# Patient Record
Sex: FEMALE | Race: WHITE | Employment: FULL TIME | URBAN - METROPOLITAN AREA
[De-identification: names, ages, dates, MRNs, and addresses within clinical notes are randomized per-mention and may not be internally consistent; named-entity substitution may affect disease eponyms.]

---

## 2017-01-16 ENCOUNTER — OFFICE VISIT (OUTPATIENT)
Dept: FAMILY MEDICINE CLINIC | Age: 57
End: 2017-01-16

## 2017-01-16 VITALS
HEART RATE: 83 BPM | SYSTOLIC BLOOD PRESSURE: 134 MMHG | HEIGHT: 65 IN | OXYGEN SATURATION: 98 % | RESPIRATION RATE: 18 BRPM | DIASTOLIC BLOOD PRESSURE: 70 MMHG | TEMPERATURE: 98.4 F | WEIGHT: 140 LBS | BODY MASS INDEX: 23.32 KG/M2

## 2017-01-16 DIAGNOSIS — R50.9 FEVER, UNSPECIFIED FEVER CAUSE: ICD-10-CM

## 2017-01-16 DIAGNOSIS — R52 BODY ACHES: ICD-10-CM

## 2017-01-16 DIAGNOSIS — J01.00 ACUTE MAXILLARY SINUSITIS, RECURRENCE NOT SPECIFIED: Primary | ICD-10-CM

## 2017-01-16 DIAGNOSIS — J02.9 SORE THROAT: ICD-10-CM

## 2017-01-16 LAB
FLUAV+FLUBV AG NOSE QL IA.RAPID: NEGATIVE POS/NEG
FLUAV+FLUBV AG NOSE QL IA.RAPID: NEGATIVE POS/NEG
S PYO AG THROAT QL: NEGATIVE
VALID INTERNAL CONTROL?: YES
VALID INTERNAL CONTROL?: YES

## 2017-01-16 RX ORDER — AMOXICILLIN 875 MG/1
875 TABLET, FILM COATED ORAL 2 TIMES DAILY
Qty: 20 TAB | Refills: 0 | Status: SHIPPED | OUTPATIENT
Start: 2017-01-16 | End: 2017-01-26

## 2017-01-16 NOTE — PROGRESS NOTES
Chief Complaint   Patient presents with    Generalized Body Aches    Nasal Congestion    Sore Throat     Patient present during walk in clinic with sx began that Saturday; have been treating with Tylenol Sinus and Tylenol. 1. Have you been to the ER, urgent care clinic since your last visit? Hospitalized since your last visit? No    2. Have you seen or consulted any other health care providers outside of the [address] [place] [address] since your last visit? Include any pap smears or colon screening. No    Denies any sick contacts. Had a fever initially. Having sinus pressure and congestion. Teeth feel sore. Last abx was in September for sinus pressure and congestion. Headaches from the congestion. Used afrin this morning which has helped. Has been coughing a lot. Productive. Green congestion. Hears herself wheezing at night. Denies any other concerns at this time. Chief Complaint   Patient presents with    Generalized Body Aches    Nasal Congestion    Sore Throat     she is a 64y.o. year old female who presents for evalution. Reviewed PmHx, RxHx, FmHx, SocHx, AllgHx and updated and dated in the chart.     Review of Systems - negative except as listed above in the HPI    Objective:     Vitals:    01/16/17 0913   BP: 134/70   Pulse: 83   Resp: 18   Temp: 98.4 °F (36.9 °C)   TempSrc: Oral   SpO2: 98%   Weight: 140 lb (63.5 kg)   Height: 5' 5\" (1.651 m)     Physical Examination: General appearance - alert, well appearing, and in no distress  Eyes - pupils equal and reactive, extraocular eye movements intact  Ears - bilateral TM's and external ear canals normal, moderate amount of fluid present behind bilateral TMs  Nose - mucosal congestion, mucosal erythema, purulent rhinorrhea and sinus tenderness noted bilateral maxillary regions with percussion  Mouth - mucous membranes moist, pharynx normal without lesions  Neck - supple, no significant adenopathy  Chest - clear to auscultation, no wheezes, rales or rhonchi, symmetric air entry, productive sounding cough  Heart - normal rate, regular rhythm, normal S1, S2, no murmurs    Assessment/ Plan:   Jeane Palencia was seen today for generalized body aches, nasal congestion and sore throat. Diagnoses and all orders for this visit:    Acute maxillary sinusitis, recurrence not specified  -     amoxicillin (AMOXIL) 875 mg tablet; Take 1 Tab by mouth two (2) times a day for 10 days. Start and complete full course of amoxil. Dwp ADRs/SEs of medication. Push fluids. Rest. Saline nasal spray for nasal congestion. OTC motrin/apap for fevers. RTC if sx persist or worsen. Sore throat / Fever, unspecified fever cause / Body aches  -     AMB POC RAPID STREP A  -     AMB POC CINDY INFLUENZA A/B TEST  Neg flu and strep. Follow-up Disposition:  Return if symptoms worsen or fail to improve. I have discussed the diagnosis with the patient and the intended plan as seen in the above orders. The patient has received an after-visit summary and questions were answered concerning future plans. Medication Side Effects and Warnings were discussed with patient: yes  Patient Labs were reviewed and or requested: yes  Patient Past Records were reviewed and or requested  yes  Patient / Caregiver Understanding of treatment plan was verbalized during office visit YES    DEWAYNE Hatfield-C    Patient Instructions   I have prescribed you the antibiotic Amoxicillin. Take this medication as directed and complete the entire course, even if you're feeling better. If you miss a dose, take it as soon as possible. If you are on birth control pills make sure you use additional back up protection during your course of amoxicillin until your next menstrual period. Common side effects of this medication include diarrhea and rash. If you develop a rash while taking this medication please stop taking this medication and call your health care provider immediately.  Notify your provider if symptoms do not improve one week after completing the course of this antibiotic. For your sore throat:  Zinc may reduce the severity of cold symptoms and could even shorten your illness. The newest zinc lozenges come formulated with herbs like peppermint and licorice to help soothe your throat and relieve chest congestion. Try Cold-Eeze Cold Remedy Plus Multi Symptom Relief Quick Melts ($12 for 24 lozenges). For your runny nose:  Try moistened with natural saline wipes. They are much more gentle than dry tissues. Try Puffs Fresh Faces nose wipes with Vicks ($5 for 45 wipes)    For your congestion:  Since the common cold is caused by a virus, the best medication is rest. But antihistamine-decongestant combo medications are a good bet for easing your symptoms such as post nasal drip and congestion. Try Claritin D 12 hour ($14 for 10 tablets).

## 2017-01-16 NOTE — LETTER
NOTIFICATION RETURN TO WORK 
 
1/16/2017 10:09 AM 
 
Ms. Janessa Pa [facility] [address] [facility] [address] To Whom It May Concern: 
 
Janessa Pa is currently under the care of [address]. Please excuse Ms. Sandhu from missing work Monday January 16th and Tuesday January 17th, 2017 due to illness. If there are questions or concerns please have the patient contact our office.  
 
 
 
Sincerely, 
 
 
Kelby Christina NP

## 2017-01-16 NOTE — MR AVS SNAPSHOT
Visit Information Date & Time Provider Department Dept. Phone Encounter #  
 1/16/2017  7:00 AM Rusty Uribe NP [address] [facility] [phone] [id number] Follow-up Instructions Return if symptoms worsen or fail to improve. Upcoming Health Maintenance Date Due Hepatitis C Screening 1960 Pneumococcal 19-64 Medium Risk (1 of 1 - PPSV23) 10/27/1979 BREAST CANCER SCRN MAMMOGRAM 10/27/2010 PAP AKA CERVICAL CYTOLOGY 7/18/2014 COLONOSCOPY 3/1/2015 INFLUENZA AGE 9 TO ADULT 8/1/2016 DTaP/Tdap/Td series (2 - Td) 7/18/2021 Allergies as of 1/16/2017  Review Complete On: 1/16/2017 By: Rusty Uribe NP Severity Noted Reaction Type Reactions Nsaids (Non-steroidal Anti-inflammatory Drug)  09/21/2012    Other (comments) Stomach upset Current Immunizations  Reviewed on 1/11/2016 Name Date TDAP Vaccine 7/18/2011 Not reviewed this visit You Were Diagnosed With   
  
 Codes Comments Acute maxillary sinusitis, recurrence not specified    -  Primary ICD-10-CM: J01.00 ICD-9-CM: 461.0 Sore throat     ICD-10-CM: J02.9 ICD-9-CM: 052 Fever, unspecified fever cause     ICD-10-CM: R50.9 ICD-9-CM: 780.60 Body aches     ICD-10-CM: R52 ICD-9-CM: 780.96 Vitals BP Pulse Temp Resp Height(growth percentile) Weight(growth percentile) 134/70 (BP 1 Location: Right arm, BP Patient Position: Sitting) 83 98.4 °F (36.9 °C) (Oral) 18 5' 5\" (1.651 m) 140 lb (63.5 kg) SpO2 BMI OB Status Smoking Status 98% 23.3 kg/m2 Postmenopausal Current Every Day Smoker BMI and BSA Data Body Mass Index Body Surface Area  
 23.3 kg/m 2 1.71 m 2 Preferred Pharmacy Pharmacy Name Phone [facility] [address] [phone] Your Updated Medication List  
  
   
 This list is accurate as of: 1/16/17 10:08 AM.  Always use your most recent med list.  
  
  
  
  
 amoxicillin 875 mg tablet Commonly known as:  AMOXIL Take 1 Tab by mouth two (2) times a day for 10 days. Prescriptions Sent to Pharmacy Refills  
 amoxicillin (AMOXIL) 875 mg tablet 0 Sig: Take 1 Tab by mouth two (2) times a day for 10 days. Class: Normal  
 Pharm[facility] [place] [address] [address] #: [phone] Route: Oral  
  
We Performed the Following AMB POC RAPID STREP A [30052 CPT(R)] AMB POC CINDY INFLUENZA A/B TEST [29321 CPT(R)] Follow-up Instructions Return if symptoms worsen or fail to improve. Patient Instructions I have prescribed you the antibiotic Amoxicillin. Take this medication as directed and complete the entire course, even if you're feeling better. If you miss a dose, take it as soon as possible. If you are on birth control pills make sure you use additional back up protection during your course of amoxicillin until your next menstrual period. Common side effects of this medication include diarrhea and rash. If you develop a rash while taking this medication please stop taking this medication and call your health care provider immediately. Notify your provider if symptoms do not improve one week after completing the course of this antibiotic. For your sore throat: 
Zinc may reduce the severity of cold symptoms and could even shorten your illness. The newest zinc lozenges come formulated with herbs like peppermint and licorice to help soothe your throat and relieve chest congestion. Try Cold-Eeze Cold Remedy Plus Multi Symptom Relief Quick Melts ($12 for 24 lozenges). For your runny nose: 
Try moistened with natural saline wipes. They are much more gentle than dry tissues. Try Puffs Fresh Faces nose wipes with Vicks ($5 for 45 wipes) For your congestion: 
Since the common cold is caused by a virus, the best medication is rest. But antihistamine-decongestant combo medications are a good bet for easing your symptoms such as post nasal drip and congestion. Try Claritin D 12 hour ($14 for 10 tablets). Introducing [address]! Yara Kumar introduces [employer] patient portal. Now you can access parts of your medical record, email your doctor's office, and request medication refills online. 1. In your internet browser, go to [email or web address] 2. Click on the First Time User? Click Here link in the Sign In box. You will see the New Member Sign Up page. 3. Enter your [employer] Access Code exactly as it appears below. You will not need to use this code after youve completed the sign-up process. If you do not sign up before the expiration date, you must request a new code. · [employer] Access Code: [email or web address] Expires: 4/16/2017 10:08 AM 
 
4. Enter the last four digits of your Social Security Number (xxxx) and Date of Birth (mm/dd/yyyy) as indicated and click Submit. You will be taken to the next sign-up page. 5. Create a [employer] ID. This will be your [employer] login ID and cannot be changed, so think of one that is secure and easy to remember. 6. Create a [employer] password. You can change your password at any time. 7. Enter your Password Reset Question and Answer. This can be used at a later time if you forget your password. 8. Enter your e-mail address. You will receive e-mail notification when new information is available in [address] 9. Click Sign Up. You can now view and download portions of your medical record. 10. Click the Download Summary menu link to download a portable copy of your medical information. If you have questions, please visit the Frequently Asked Questions section of the [employer] website. Remember, [employer] is NOT to be used for urgent needs. For medical emergencies, dial 911. Now available from your iPhone and Android! Please provide this summary of care documentation to your next provider. Your primary care clinician is listed as TRACEY GARRISON. If you have any questions after today's visit, please call [phone].

## 2017-01-16 NOTE — PROGRESS NOTES
Chief Complaint   Patient presents with    Generalized Body Aches    Nasal Congestion    Sore Throat     Patient present during walk in clinic with sx began that Saturday; have been treating with Tylenol Sinus and Tylenol. 1. Have you been to the ER, urgent care clinic since your last visit? Hospitalized since your last visit? No    2. Have you seen or consulted any other health care providers outside of the [address] [place] [address] since your last visit? Include any pap smears or colon screening.  No

## 2017-08-10 ENCOUNTER — OFFICE VISIT (OUTPATIENT)
Dept: FAMILY MEDICINE CLINIC | Age: 57
End: 2017-08-10

## 2017-08-10 VITALS
WEIGHT: 148 LBS | HEIGHT: 65 IN | SYSTOLIC BLOOD PRESSURE: 151 MMHG | RESPIRATION RATE: 18 BRPM | BODY MASS INDEX: 24.66 KG/M2 | DIASTOLIC BLOOD PRESSURE: 78 MMHG | OXYGEN SATURATION: 97 % | TEMPERATURE: 98.2 F | HEART RATE: 73 BPM

## 2017-08-10 DIAGNOSIS — R31.9 HEMATURIA: ICD-10-CM

## 2017-08-10 DIAGNOSIS — Z87.442 HISTORY OF KIDNEY STONES: ICD-10-CM

## 2017-08-10 DIAGNOSIS — N81.10 BLADDER PROLAPSE, FEMALE, ACQUIRED: Primary | ICD-10-CM

## 2017-08-10 DIAGNOSIS — R35.0 URINARY FREQUENCY: ICD-10-CM

## 2017-08-10 LAB
BILIRUB UR QL STRIP: NEGATIVE
GLUCOSE UR-MCNC: NEGATIVE MG/DL
KETONES P FAST UR STRIP-MCNC: NEGATIVE MG/DL
PH UR STRIP: 6.5 [PH] (ref 4.6–8)
PROT UR QL STRIP: NEGATIVE MG/DL
SP GR UR STRIP: 1.02 (ref 1–1.03)
UA UROBILINOGEN AMB POC: NORMAL (ref 0.2–1)
URINALYSIS CLARITY POC: CLEAR
URINALYSIS COLOR POC: YELLOW
URINE BLOOD POC: NORMAL
URINE LEUKOCYTES POC: NEGATIVE
URINE NITRITES POC: NEGATIVE

## 2017-08-10 RX ORDER — TRAMADOL HYDROCHLORIDE 50 MG/1
50 TABLET ORAL
Qty: 21 TAB | Refills: 0 | Status: SHIPPED | OUTPATIENT
Start: 2017-08-10 | End: 2017-08-10 | Stop reason: SINTOL

## 2017-08-10 RX ORDER — HYDROCODONE BITARTRATE AND ACETAMINOPHEN 5; 325 MG/1; MG/1
1 TABLET ORAL
Qty: 21 TAB | Refills: 0 | Status: SHIPPED | OUTPATIENT
Start: 2017-08-10

## 2017-08-10 RX ORDER — CIPROFLOXACIN 500 MG/1
500 TABLET ORAL 2 TIMES DAILY
Qty: 14 TAB | Refills: 0 | Status: SHIPPED | OUTPATIENT
Start: 2017-08-10 | End: 2017-08-17

## 2017-08-10 NOTE — PROGRESS NOTES
Chief Complaint   Patient presents with    Incomplete Bladder Emptying     feels like bladder is \"prolapsed\"    Abdominal Pain     uterine pain

## 2017-08-10 NOTE — MR AVS SNAPSHOT
Visit Information Date & Time Provider Department Dept. Phone Encounter #  
 8/10/2017  1:15 PM Karen Jay NP [facility] [phone] [id number] Follow-up Instructions Return if symptoms worsen or fail to improve. Upcoming Health Maintenance Date Due Hepatitis C Screening 1960 Pneumococcal 19-64 Medium Risk (1 of 1 - PPSV23) 10/27/1979 BREAST CANCER SCRN MAMMOGRAM 10/27/2010 PAP AKA CERVICAL CYTOLOGY 7/18/2014 COLONOSCOPY 3/1/2015 INFLUENZA AGE 9 TO ADULT 8/1/2017 DTaP/Tdap/Td series (2 - Td) 7/18/2021 Allergies as of 8/10/2017  Review Complete On: 8/10/2017 By: Karen Jay NP Severity Noted Reaction Type Reactions Nsaids (Non-steroidal Anti-inflammatory Drug)  09/21/2012    Other (comments) Stomach upset Current Immunizations  Reviewed on 1/11/2016 Name Date TDAP Vaccine 7/18/2011 Not reviewed this visit You Were Diagnosed With   
  
 Codes Comments Bladder prolapse, female, acquired    -  Primary ICD-10-CM: N81.10 ICD-9-CM: 618.01 Urinary frequency     ICD-10-CM: R35.0 ICD-9-CM: 788.41 Hematuria     ICD-10-CM: R31.9 ICD-9-CM: 599.70 History of kidney stones     ICD-10-CM: Z87.442 ICD-9-CM: V13.01 Vitals BP Pulse Temp Resp Height(growth percentile) Weight(growth percentile) 151/78 73 98.2 °F (36.8 °C) (Oral) 18 5' 5\" (1.651 m) 148 lb (67.1 kg) SpO2 BMI OB Status Smoking Status 97% 24.63 kg/m2 Postmenopausal Current Every Day Smoker Vitals History BMI and BSA Data Body Mass Index Body Surface Area  
 24.63 kg/m 2 1.75 m 2 Preferred Pharmacy Pharmacy Name Phone [facility] [address] [facility] [phone] Your Updated Medication List  
  
   
This list is accurate as of: 8/10/17  1:59 PM.  Always use your most recent med list.  
  
  
  
  
 ciprofloxacin HCl 500 mg tablet Commonly known as:  CIPRO Take 1 Tab by mouth two (2) times a day for 7 days. traMADol 50 mg tablet Commonly known as:  ULTRAM  
Take 1 Tab by mouth every eight (8) hours as needed for Pain. Max Daily Amount: 150 mg.  
  
  
  
  
Prescriptions Printed Refills  
 traMADol (ULTRAM) 50 mg tablet 0 Sig: Take 1 Tab by mouth every eight (8) hours as needed for Pain. Max Daily Amount: 150 mg.  
 Class: Print Route: Oral  
  
Prescriptions Sent to Pharmacy Refills  
 ciprofloxacin HCl (CIPRO) 500 mg tablet 0 Sig: Take 1 Tab by mouth two (2) times a day for 7 days. Class: Normal  
 Pharmacy: [facility], [address] [place] [address],[address] [facility] [place] [address] Ph #: [phone] Route: Oral  
  
We Performed the Following AMB POC URINALYSIS DIP STICK AUTO W/O MICRO [71402 CPT(R)] REFERRAL TO UROLOGY [TXB117 Custom] Follow-up Instructions Return if symptoms worsen or fail to improve. Referral Information Referral ID Referred By Referred To  
  
 [id number] Shan Moreno MD   
   [address] [place] [address] Be Lance Eddy Phone: [phone] Fax: [phone] Visits Status Start Date End Date 1 New Request 8/10/17 8/10/18 If your referral has a status of pending review or denied, additional information will be sent to support the outcome of this decision. Introducing [facility]! Jodee Cabral introduces [employer] patient portal. Now you can access parts of your medical record, email your doctor's office, and request medication refills online. 1. In your internet browser, go to [email or web address] 2. Click on the First Time User? Click Here link in the Sign In box. You will see the New Member Sign Up page. 3. Enter your [employer] Access Code exactly as it appears below.  You will not need to use this code after youve completed the sign-up process. If you do not sign up before the expiration date, you must request a new code. · [employer] Access Code: [email or web address] Expires: 11/8/2017  1:59 PM 
 
4. Enter the last four digits of your Social Security Number (xxxx) and Date of Birth (mm/dd/yyyy) as indicated and click Submit. You will be taken to the next sign-up page. 5. Create a [employer] ID. This will be your [employer] login ID and cannot be changed, so think of one that is secure and easy to remember. 6. Create a [employer] password. You can change your password at any time. 7. Enter your Password Reset Question and Answer. This can be used at a later time if you forget your password. 8. Enter your e-mail address. You will receive e-mail notification when new information is available in [address] 9. Click Sign Up. You can now view and download portions of your medical record. 10. Click the Download Summary menu link to download a portable copy of your medical information. If you have questions, please visit the Frequently Asked Questions section of the [employer] website. Remember, [employer] is NOT to be used for urgent needs. For medical emergencies, dial 911. Now available from your iPhone and Android! Please provide this summary of care documentation to your next provider. Your primary care clinician is listed as TRACEY GARRISON. If you have any questions after today's visit, please call [phone].

## 2017-08-10 NOTE — PROGRESS NOTES
Chief Complaint   Patient presents with    Incomplete Bladder Emptying     feels like bladder is \"prolapsed\"    Abdominal Pain     uterine pain     Pt has history of prolapsed bladder. Has not seen a specialist for this in the past. Had been going back and forth to urology due to her history of recurrent kidney stones. Causing a pulling sensation in the abdomen. Has been progressively worsening. Has been reluctant to have surgery due to problems with going septic after her last operation. Denies any pain with urination. Has noticed more prolapse when her bladder is full or at the end of the day from prolonged standing. Denies any other concerns at this time. Chief Complaint   Patient presents with    Incomplete Bladder Emptying     feels like bladder is \"prolapsed\"    Abdominal Pain     uterine pain     she is a 64y.o. year old female who presents for evalution. Reviewed PmHx, RxHx, FmHx, SocHx, AllgHx and updated and dated in the chart. Review of Systems - negative except as listed above in the HPI    Objective:     Vitals:    08/10/17 1326   BP: 151/78   Pulse: 73   Resp: 18   Temp: 98.2 °F (36.8 °C)   TempSrc: Oral   SpO2: 97%   Weight: 148 lb (67.1 kg)   Height: 5' 5\" (1.651 m)     Physical Examination: General appearance - alert, well appearing, and in no distress  Mental status - anxious  Chest - clear to auscultation, no wheezes, rales or rhonchi, symmetric air entry  Heart - normal rate, regular rhythm, normal S1, S2, no murmurs  Abdomen - soft, nontender, nondistended  bowel sounds normal  no CVA tenderness  Pelvic - declined by patient    Assessment/ Plan:   Diagnoses and all orders for this visit:    1. Bladder prolapse, female, acquired  -     REFERRAL TO UROLOGY  Recommended pt est care with urology for further evaluation. 2. Urinary frequency / 3. Hematuria  -     AMB POC URINALYSIS DIP STICK AUTO W/O MICRO  -     ciprofloxacin HCl (CIPRO) 500 mg tablet;  Take 1 Tab by mouth two (2) times a day for 7 days. Start and complete full course of cipro. Reviewed SEs/ADRs of medication. Push fluids. Follow up if sx persist or worsen to retest urine. 4. History of kidney stones  -     ciprofloxacin HCl (CIPRO) 500 mg tablet; Take 1 Tab by mouth two (2) times a day for 7 days. Reviewed acute / worsening s/sx that warrant more immediate medical attention and pt verbalized understanding of this. Continue to monitor urine closely. Other orders  -     HYDROcodone-acetaminophen (NORCO) 5-325 mg per tablet; Take 1 Tab by mouth every eight (8) hours as needed for Pain. Max Daily Amount: 3 Tabs. Follow-up Disposition:  Return if symptoms worsen or fail to improve. I have discussed the diagnosis with the patient and the intended plan as seen in the above orders. The patient has received an after-visit summary and questions were answered concerning future plans. Medication Side Effects and Warnings were discussed with patient: yes  Patient Labs were reviewed and or requested: yes  Patient Past Records were reviewed and or requested  yes  Patient / Caregiver Understanding of treatment plan was verbalized during office visit YES    EUN Abraham    There are no Patient Instructions on file for this visit.

## 2018-06-26 ENCOUNTER — DOCUMENTATION ONLY (OUTPATIENT)
Dept: FAMILY MEDICINE CLINIC | Age: 58
End: 2018-06-26

## 2018-06-26 NOTE — PROGRESS NOTES
[address] [facility] request for medical records was faxed to [address] [place] [address] to be processed.

## 2019-05-14 ENCOUNTER — DOCUMENTATION ONLY (OUTPATIENT)
Dept: FAMILY MEDICINE CLINIC | Age: 59
End: 2019-05-14

## 2019-05-14 NOTE — PROGRESS NOTES
[facility] for medical records was faxed to Julio [facility] [phone] to be processed